# Patient Record
Sex: FEMALE | Race: WHITE | Employment: PART TIME | URBAN - NONMETROPOLITAN AREA
[De-identification: names, ages, dates, MRNs, and addresses within clinical notes are randomized per-mention and may not be internally consistent; named-entity substitution may affect disease eponyms.]

---

## 2017-08-29 ENCOUNTER — HOSPITAL ENCOUNTER (OUTPATIENT)
Dept: ULTRASOUND IMAGING | Age: 47
Discharge: HOME | End: 2017-08-29
Payer: COMMERCIAL

## 2017-08-29 DIAGNOSIS — R10.2 PELVIC PAIN IN FEMALE: ICD-10-CM

## 2017-08-29 PROCEDURE — 76830 TRANSVAGINAL US NON-OB: CPT

## 2017-08-29 PROCEDURE — 76856 US EXAM PELVIC COMPLETE: CPT

## 2018-02-16 ENCOUNTER — HOSPITAL ENCOUNTER (OUTPATIENT)
Dept: PHYSICAL THERAPY | Age: 48
Setting detail: THERAPIES SERIES
Discharge: HOME | End: 2018-02-16
Payer: COMMERCIAL

## 2018-02-16 PROCEDURE — 97162 PT EVAL MOD COMPLEX 30 MIN: CPT

## 2018-02-16 ASSESSMENT — PAIN SCALES - GENERAL: PAINLEVEL_OUTOF10: 6

## 2018-02-16 NOTE — PROGRESS NOTES
No myotomal weakness noted  AROM LLE (degrees)  LLE AROM : WFL  LLE General AROM: Mild tightness into hip IR       AROM RLE (degrees)  RLE AROM: WFL  RLE General AROM: Mild tightness into hip IR  AROM LLE (degrees)  LLE AROM : WFL  LLE General AROM: Mild tightness into hip IR              Additional Measures  Special Tests: DTR's decrased bilaterally L4                         Assessment  Body structures, Functions, Activity limitations: Decreased functional mobility , Decreased ADL status, Decreased ROM  Assessment: Limited tolerance to complete daily or work activities including standing, walking and lifting as well as sleep deprivation resulting from lumbar pain. Prognosis: Good  Decision Making: Medium Complexity    Clinical Presentation:  [] Stable/Uncomplicated  [x] Evolving [] Unstable/Unpredictable  The Following Comorbities will impact the patients progression and Plan of Care:   [x] Diabetes    [] Stroke  [] Cardiac Disease/Pacemaker [] Previous Orthopedic Injury/Surgery  [] Seizure Disorder   [] WB Restrictions  [] Obesity    [] Neuropathy     Activity Tolerance: Patient limited by pain  [x] Primary Impairment :imited tolerance to daily and work activities including walking, standing and lifting due to low back pain.   Education: Patient Education: PT POC/goals;  HEP       Goals  Short term goals  Time Frame for Short term goals: 6 visits  Short term goal 1: Educate on home stretching exercises    Long term goals  Time Frame for Long term goals : 12 visits - expires 4/13/18  Long term goal 1: Upgrade home program to include postural and core exercises  Long term goal 2: Decrease subjective lumbar pain to 2/10 with standing work activities  Long term goal 3: sleep x 4 hrs without lumbar pain    Patient's Goal:    Get rid of back pain; complete activities and sleep without pain    Timed Code Treatment Minutes: 0 Minutes  Total Treatment Time: 50     Time In: 0800  Time Out: Eliz Min 2/16/2018

## 2018-02-21 ENCOUNTER — APPOINTMENT (OUTPATIENT)
Dept: PHYSICAL THERAPY | Age: 48
End: 2018-02-21
Payer: COMMERCIAL

## 2018-02-27 ENCOUNTER — HOSPITAL ENCOUNTER (OUTPATIENT)
Dept: PHYSICAL THERAPY | Age: 48
Setting detail: THERAPIES SERIES
Discharge: HOME | End: 2018-02-27
Payer: COMMERCIAL

## 2018-02-27 PROCEDURE — 97110 THERAPEUTIC EXERCISES: CPT

## 2018-02-27 ASSESSMENT — PAIN SCALES - GENERAL: PAINLEVEL_OUTOF10: 7

## 2018-03-01 ENCOUNTER — APPOINTMENT (OUTPATIENT)
Dept: PHYSICAL THERAPY | Age: 48
End: 2018-03-01
Payer: COMMERCIAL

## 2018-03-05 ENCOUNTER — HOSPITAL ENCOUNTER (OUTPATIENT)
Dept: PHYSICAL THERAPY | Age: 48
Setting detail: THERAPIES SERIES
Discharge: HOME | End: 2018-03-05
Payer: COMMERCIAL

## 2018-03-05 PROCEDURE — 97110 THERAPEUTIC EXERCISES: CPT

## 2018-03-05 PROCEDURE — 97012 MECHANICAL TRACTION THERAPY: CPT

## 2018-03-05 ASSESSMENT — PAIN SCALES - GENERAL: PAINLEVEL_OUTOF10: 8

## 2018-03-05 NOTE — PROGRESS NOTES
Phone: [address]      Fax: [phone]                            Outpatient Physical Therapy                                                                            Daily Note    Date: 3/5/2018  Patient Name: Stepan Stratton        MRN: [id number]   ACCT#:  [de-identified]  : 1970  (49 y.o.)    Referring Practitioner: Dr. Rhiannon Cruz    Referral Date : 18    Diagnosis: Low Back pain  Treatment Diagnosis: Low back pain    Onset Date: 18    Per Physician Order  Total # of Visits to Date: 3  No Show: 0  Canceled Appointment: 0    Pre-Treatment Pain:  8/10     Assessment  Assessment: Patient continues to reports 7-8/10 pain with standing and work activities. Attempting LE/lumbar stretches as able. Iniitaited UE postural tband ex and issued HEP. Initiated int lumbar traction @ 50# x 10 min for decompression; will monitor benefits and continue as indicated according to symptoms. Would also consider aquatic therapy.   Chart Reviewed: Yes    Plan  Plan: Continue with current plan    Exercises/Modalities/Manual:  See DocFlow Sheet    Education: pelvic traction    Goals  (Total # of Visits to Date: 3)   Short Term Goals - Time Frame for Short term goals: 6 visits  Short term goal 1: Educate on home stretching exercises                Long Term Goals - Time Frame for Long term goals : 12 visits - expires 18  Long term goal 1: Upgrade home program to include postural and core exercises  Long term goal 2: Decrease subjective lumbar pain to 2/10 with standing work activities  Long term goal 3: sleep x 4 hrs without lumbar pain          Post Treatment Pain:  7/10    Time In: 0800    Time Out : 0845        Timed Code Treatment Minutes: 27 Minutes  Total Treatment Time: [address] [facility] Number: [id number]    Date: 3/5/2018

## 2018-03-08 ENCOUNTER — HOSPITAL ENCOUNTER (OUTPATIENT)
Dept: PHYSICAL THERAPY | Age: 48
Setting detail: THERAPIES SERIES
End: 2018-03-08
Payer: COMMERCIAL

## 2018-03-09 ENCOUNTER — APPOINTMENT (OUTPATIENT)
Dept: PHYSICAL THERAPY | Age: 48
End: 2018-03-09
Payer: COMMERCIAL

## 2018-03-12 ENCOUNTER — HOSPITAL ENCOUNTER (OUTPATIENT)
Dept: PHYSICAL THERAPY | Age: 48
Setting detail: THERAPIES SERIES
End: 2018-03-12
Payer: COMMERCIAL

## 2018-03-15 ENCOUNTER — HOSPITAL ENCOUNTER (OUTPATIENT)
Dept: PHYSICAL THERAPY | Age: 48
Setting detail: THERAPIES SERIES
Discharge: HOME | End: 2018-03-15
Payer: COMMERCIAL

## 2018-03-15 PROCEDURE — 97110 THERAPEUTIC EXERCISES: CPT

## 2018-03-15 PROCEDURE — 97012 MECHANICAL TRACTION THERAPY: CPT

## 2018-03-15 ASSESSMENT — PAIN SCALES - GENERAL: PAINLEVEL_OUTOF10: 4

## 2018-03-19 ENCOUNTER — HOSPITAL ENCOUNTER (OUTPATIENT)
Dept: PHYSICAL THERAPY | Age: 48
Setting detail: THERAPIES SERIES
Discharge: HOME | End: 2018-03-19
Payer: COMMERCIAL

## 2018-03-19 PROCEDURE — 97110 THERAPEUTIC EXERCISES: CPT

## 2018-03-19 PROCEDURE — 97012 MECHANICAL TRACTION THERAPY: CPT

## 2018-03-21 ENCOUNTER — HOSPITAL ENCOUNTER (OUTPATIENT)
Dept: PHYSICAL THERAPY | Age: 48
Setting detail: THERAPIES SERIES
End: 2018-03-21
Payer: COMMERCIAL

## 2018-03-27 ENCOUNTER — HOSPITAL ENCOUNTER (OUTPATIENT)
Dept: PHYSICAL THERAPY | Age: 48
Setting detail: THERAPIES SERIES
Discharge: HOME | End: 2018-03-27
Payer: COMMERCIAL

## 2018-03-27 PROCEDURE — 97012 MECHANICAL TRACTION THERAPY: CPT

## 2018-03-27 PROCEDURE — 97110 THERAPEUTIC EXERCISES: CPT

## 2018-03-27 ASSESSMENT — PAIN SCALES - GENERAL: PAINLEVEL_OUTOF10: 8

## 2018-03-29 ENCOUNTER — HOSPITAL ENCOUNTER (OUTPATIENT)
Dept: PHYSICAL THERAPY | Age: 48
Setting detail: THERAPIES SERIES
Discharge: HOME | End: 2018-03-29
Payer: COMMERCIAL

## 2018-04-04 ENCOUNTER — HOSPITAL ENCOUNTER (OUTPATIENT)
Dept: PHYSICAL THERAPY | Age: 48
Setting detail: THERAPIES SERIES
Discharge: HOME | End: 2018-04-04
Payer: COMMERCIAL

## 2018-06-25 ENCOUNTER — HOSPITAL ENCOUNTER (OUTPATIENT)
Dept: NON INVASIVE DIAGNOSTICS | Age: 48
Discharge: HOME | End: 2018-06-25
Payer: COMMERCIAL

## 2018-06-25 LAB
LV EF: 50 %
LVEF MODALITY: NORMAL

## 2018-06-25 PROCEDURE — 93306 TTE W/DOPPLER COMPLETE: CPT

## 2019-09-25 ENCOUNTER — HOSPITAL ENCOUNTER (OUTPATIENT)
Dept: MRI IMAGING | Age: 49
Discharge: HOME | End: 2019-09-27
Payer: COMMERCIAL

## 2019-09-25 DIAGNOSIS — M54.5 LOW BACK PAIN, UNSPECIFIED BACK PAIN LATERALITY, UNSPECIFIED CHRONICITY, WITH SCIATICA PRESENCE UNSPECIFIED: ICD-10-CM

## 2019-09-25 PROCEDURE — 72148 MRI LUMBAR SPINE W/O DYE: CPT

## 2021-03-29 ENCOUNTER — OFFICE VISIT (OUTPATIENT)
Dept: SURGERY | Age: 51
End: 2021-03-29

## 2021-03-29 VITALS
TEMPERATURE: 97.2 F | WEIGHT: 221 LBS | DIASTOLIC BLOOD PRESSURE: 80 MMHG | HEIGHT: 69 IN | BODY MASS INDEX: 32.73 KG/M2 | OXYGEN SATURATION: 93 % | HEART RATE: 82 BPM | SYSTOLIC BLOOD PRESSURE: 124 MMHG

## 2021-03-29 DIAGNOSIS — Z72.0 TOBACCO ABUSE: ICD-10-CM

## 2021-03-29 DIAGNOSIS — Z86.010 HISTORY OF COLON POLYPS: Primary | ICD-10-CM

## 2021-03-29 DIAGNOSIS — Z80.0 FAMILY HISTORY OF COLON CANCER: ICD-10-CM

## 2021-03-29 DIAGNOSIS — D68.2 FACTOR V DEFICIENCY (HCC): ICD-10-CM

## 2021-03-29 PROCEDURE — 99999 PR OFFICE/OUTPT VISIT,PROCEDURE ONLY: CPT | Performed by: SURGERY

## 2021-03-29 RX ORDER — MAGNESIUM OXIDE 400 MG/1
TABLET ORAL
COMMUNITY
Start: 2020-10-21 | End: 2021-04-21

## 2021-03-29 RX ORDER — DULOXETIN HYDROCHLORIDE 30 MG/1
30 CAPSULE, DELAYED RELEASE ORAL
COMMUNITY
Start: 2021-03-19 | End: 2021-04-21

## 2021-03-29 RX ORDER — RIZATRIPTAN BENZOATE 10 MG/1
TABLET ORAL
COMMUNITY
Start: 2020-10-21

## 2021-03-29 RX ORDER — NAPROXEN SODIUM 550 MG/1
TABLET ORAL
COMMUNITY
Start: 2020-10-21

## 2021-03-29 RX ORDER — OXYBUTYNIN CHLORIDE 10 MG/1
10 TABLET, EXTENDED RELEASE ORAL
COMMUNITY
Start: 2020-12-02

## 2021-03-29 RX ORDER — DIAZEPAM 5 MG/1
5 TABLET ORAL PRN
COMMUNITY
Start: 2021-03-19

## 2021-03-29 RX ORDER — PRAMIPEXOLE DIHYDROCHLORIDE 0.25 MG/1
1 TABLET ORAL DAILY
COMMUNITY

## 2021-03-29 NOTE — LETTER
[facility] Surgical Specialist - Denise Ralph  [address] [place] [id number]  Phone: [phone]  Fax: [phone] [facility] Phill Pineda MD        April 2, 2021     Rafa Roman MD  [address]    Patient: Red Lanier  MR Number: [id number]  YOB: 1970  Date of Visit: 3/29/2021    Dear Dr. Rafa Roman: Thank you for the request for consultation for Mayo Moran to me for the evaluation of screening colonoscopy, history of colon polyps. Below are the relevant portions of my assessment and plan of care. ASSESSMENT     Diagnosis Orders   1. History of colon polyps     2. Factor V deficiency ([facility] [address]     3. BMI 32.0-32.9,adult     4. Tobacco abuse     5. Family history of colon cancer         PLAN    We will proceed with colonoscopy in the coming weeks. Risks, benefits, alternatives thoroughly reviewed and accepted by Ms. Fontaine, including GI bleeding, perforation, missed lesions, COVID-19 exposure/infection, etc.  Discussed importance of complete tobacco cessation. If you have questions, please do not hesitate to call me. I look forward to following Shaw[place] Kin along with you.     Sincerely,        Laith Ybarra MD

## 2021-04-02 ASSESSMENT — ENCOUNTER SYMPTOMS
TROUBLE SWALLOWING: 0
SHORTNESS OF BREATH: 0
CHOKING: 0
NAUSEA: 0
COUGH: 0
ABDOMINAL PAIN: 0
SORE THROAT: 0
BLOOD IN STOOL: 0
BACK PAIN: 0
VOMITING: 0

## 2021-04-02 NOTE — PROGRESS NOTES
Ravin Tobin MD  General Surgery, Endoscopy  Chief Medical Officer    [address] [facility]  [address] [address]  Dept: [phone]  Fax: [phone]  Chief Complaint   Patient presents with    New Patient    Colonoscopy     Patient referred by Faheem Roman MD for screening colonoscopy. About 7 years since last colonoscopy        HPI    Ms Destin Mccarthy is a 58-year-old white female kindly referred to me by Faheem Roman MD for screening colonoscopy. She has no significant GI complaints. No abdominal pain. Normal daily bowel movements, formed and brown, no blood. No recent weight changes, 221 pounds, BMI 33. Multiple previous colonoscopies with polypectomies in [place] by Dr. Catalino Newman, most recently 7 years ago as she recalls. History of factor V, for which she takes Xarelto. She continues to smoke 1 pack/day. Cough, fever nor other respiratory symptoms. No history of COVID-19. Father treated for colon cancer. Review of Systems   Constitutional: Negative for activity change, appetite change, chills, fever and unexpected weight change. HENT: Negative for nosebleeds, sneezing, sore throat and trouble swallowing. Eyes: Negative for visual disturbance. Respiratory: Negative for cough, choking and shortness of breath. Cardiovascular: Negative for chest pain, palpitations and leg swelling. Gastrointestinal: Negative for abdominal pain, blood in stool, nausea and vomiting. Genitourinary: Negative for dysuria, flank pain and hematuria. Musculoskeletal: Positive for arthralgias. Negative for back pain, gait problem and myalgias. Allergic/Immunologic: Negative for immunocompromised state. Neurological: Negative for dizziness, seizures, syncope, weakness and headaches. Hematological: Does not bruise/bleed easily. Psychiatric/Behavioral: Negative for confusion and sleep disturbance. No past medical history on file.     No past surgical history on file. No family history on file. Allergies:  See list    Current Outpatient Medications   Medication Sig Dispense Refill    Cholecalciferol 75 MCG (3000 UT) TABS Take by mouth      diazePAM (VALIUM) 5 MG tablet Take 5 mg by mouth.  DULoxetine (CYMBALTA) 30 MG extended release capsule Take 30 mg by mouth      fluticasone-umeclidin-vilant (TRELEGY ELLIPTA) 100-62.5-25 MCG/INH AEPB Inhale into the lungs daily      magnesium oxide (MAG-OX) 400 MG tablet 1 po at bed      metFORMIN (GLUCOPHAGE) 500 MG tablet Take 500 mg by mouth 2 times daily (with meals)      naproxen sodium (ANAPROX) 550 MG tablet 1 po q 12 hours prn headache      oxybutynin (DITROPAN-XL) 10 MG extended release tablet Take 10 mg by mouth      pramipexole (MIRAPEX) 0.25 MG tablet Take 1 tablet by mouth daily      rivaroxaban (XARELTO) 20 MG TABS tablet Take 20 mg by mouth      rizatriptan (MAXALT) 10 MG tablet 1 po prn headache May repeat in 2 hours if needed      topiramate ER (TROKENDI XR) 25 MG CP24 1 po at bed as directed       No current facility-administered medications for this visit.         Social History     Socioeconomic History    Marital status:      Spouse name: None    Number of children: None    Years of education: None    Highest education level: None   Occupational History    None   Social Needs    Financial resource strain: None    Food insecurity     Worry: None     Inability: None    Transportation needs     Medical: None     Non-medical: None   Tobacco Use    Smoking status: Current Every Day Smoker     Types: Cigars    Smokeless tobacco: Never Used   Substance and Sexual Activity    Alcohol use: None    Drug use: Never    Sexual activity: None   Lifestyle    Physical activity     Days per week: None     Minutes per session: None    Stress: None   Relationships    Social connections     Talks on phone: None     Gets together: None     Attends Y[employer] service: None BMI 32.0-32.9,adult     4. Tobacco abuse     5. Family history of colon cancer         PLAN    We will proceed with colonoscopy in the coming weeks. Risks, benefits, alternatives thoroughly reviewed and accepted by Ms. Fontaine, including GI bleeding, perforation, missed lesions, COVID-19 exposure/infection, etc.  Discussed importance of complete tobacco cessation.        Ravin Tobin MD

## 2021-04-02 NOTE — PATIENT INSTRUCTIONS
Patient Education        Learning About Colonoscopy  What is a colonoscopy? A colonoscopy is a test (also called a procedure) that lets a doctor look inside your large intestine. The doctor uses a thin, lighted tube called a colonoscope. The doctor uses it to look for small growths called polyps, colon or rectal cancer (colorectal cancer), or other problems like bleeding. During the procedure, the doctor can take samples of tissue. The samples can then be checked for cancer or other conditions. The doctor can also take out polyps. How is a colonoscopy done? This procedure is done in a doctor's office or a clinic or hospital. You will get medicine to help you relax and not feel pain. Some people find that they don't remember having the test because of the medicine. The doctor gently moves the colonoscope, or scope, through the colon. The scope is also a small video camera. It lets the doctor see the colon and take pictures. How do you prepare for the procedure? You need to clean out your colon before the procedure so the doctor can see all of your colon. This process may start a day or two before the test. This depends on which \"colon prep\" your doctor recommends. To clean your colon, you stop eating solid foods and drink only clear liquids. You can have water, tea, coffee, clear juices, clear broths, flavored ice pops, and gelatin (such as Jell-O). Do not drink anything red or purple. The day or night before the procedure, you drink a large amount of a special liquid. This causes loose, frequent stools. You will go to the bathroom a lot. It's very important to drink all of the liquid. If you have problems drinking it, call your doctor. Some people don't go to work or do their usual activities on the day of the prep. Arrange to have someone take you home after the test.  What can you expect after a colonoscopy? Your doctor will tell you when you can eat and do your usual activities.   Drink a lot of fluid after the test to replace the fluids you may have lost during the colon prep. But don't drink alcohol. Your doctor will talk to you about when you'll need your next colonoscopy. The results of your test and your risk for colorectal cancer will help your doctor decide how often you need to be checked. After the test, you may be bloated or have gas pains. You may need to pass gas. If a biopsy was done or a polyp was removed, you may have streaks of blood in your stool (feces) for a few days. If polyps were taken out, your doctor may tell you to avoid taking aspirin and nonsteroidal anti-inflammatory drugs (NSAIDs) for 7 to 14 days. Problems such as heavy rectal bleeding may not occur until several weeks after the test. This isn't common. But it can happen after polyps are removed. Follow-up care is a key part of your treatment and safety. Be sure to make and go to all appointments, and call your doctor if you are having problems. It's also a good idea to know your test results and keep a list of the medicines you take. Where can you learn more? Go to https://[employer]. org and sign in to your [employer] account. Enter C369 in the [employer] box to learn more about \"Learning About Colonoscopy. \"     If you do not have an account, please click on the \"Sign Up Now\" link. Current as of: December 17, 2020               Content Version: 12.8  © 2759-3931 [employer]. Care instructions adapted under license by [facility] ([facility]). If you have questions about a medical condition or this instruction, always ask your healthcare professional. [place] [address] any warranty or liability for your use of this information.

## 2021-04-21 DIAGNOSIS — Z01.818 PRE-OP TESTING: Primary | ICD-10-CM

## 2021-04-23 ENCOUNTER — TELEPHONE (OUTPATIENT)
Dept: SURGERY | Age: 51
End: 2021-04-23

## 2021-10-21 ENCOUNTER — HOSPITAL ENCOUNTER (OUTPATIENT)
Age: 51
Discharge: HOME | End: 2021-10-23
Payer: COMMERCIAL

## 2021-10-21 ENCOUNTER — HOSPITAL ENCOUNTER (OUTPATIENT)
Dept: GENERAL RADIOLOGY | Age: 51
Discharge: HOME | End: 2021-10-23
Payer: COMMERCIAL

## 2021-10-21 DIAGNOSIS — M67.40 GANGLION CYST: ICD-10-CM

## 2021-10-21 PROCEDURE — 73110 X-RAY EXAM OF WRIST: CPT

## 2023-03-08 ENCOUNTER — TELEPHONE (OUTPATIENT)
Dept: DIABETES SERVICES | Age: 53
End: 2023-03-08

## 2023-03-08 NOTE — PROGRESS NOTES
Dear Dr. Ansley Mckeon,  I have been unsuccessful in my attempts to contact and schedule [facility] for diabetes education. I will be happy to see [employer] in the future if she desires.    Thank you for the referral.      Teodoro Cornejo RN  [address]  [phone]